# Patient Record
(demographics unavailable — no encounter records)

---

## 2025-06-27 NOTE — ASSESSMENT
[FreeTextEntry1] : 78F   New dx Non Ischemic Cardiomyopathy LVEF 30-35%.  Hypertensive disease and MR likely played a role Moderate to Severe Mitral Regurgitation Moderate non obstructive CAD on CCTA HTN HLD   EKG today for sCHF - sinus rhyhtm LVH with strain Prior records personally reviewed [place] hospitalization for new heart failure, discharged 6/11/25.    - she is well compensated, stable weight, exam benign.   BPs can likely be better optimized but patient is very anti medication and wants to be off her current meds.  Counseled on importance of medical therapy for severe sCHF.  It appears she will continue for now - check labs assess renal function - continue toprol xl 50mg, entresto 24-26mg bid for CHF - continue jardiance 10mg for CHF, mild diuretic effect appears to be all she needs at the moment.  No loop diuretics warranted.   - continue aspirin and lipitor 20mg for moderate non obs CAD - if labs stable, can return in 4-6 weeks for reassessment - will obtain TTE after 3 months of GDMT, reassess LVEF and severity of MR once optimized.

## 2025-06-27 NOTE — PHYSICAL EXAM
[Well Developed] : well developed [Well Nourished] : well nourished [No Acute Distress] : no acute distress [Normal Conjunctiva] : normal conjunctiva [Normal Venous Pressure] : normal venous pressure [No Carotid Bruit] : no carotid bruit [Normal S1, S2] : normal S1, S2 [No Murmur] : no murmur [No Rub] : no rub [No Gallop] : no gallop [Clear Lung Fields] : clear lung fields [Good Air Entry] : good air entry [No Respiratory Distress] : no respiratory distress  [Soft] : abdomen soft [Non Tender] : non-tender [Normal Bowel Sounds] : normal bowel sounds [No Masses/organomegaly] : no masses/organomegaly [Moves all extremities] : moves all extremities [No Focal Deficits] : no focal deficits [Normal Speech] : normal speech [No ulcers] : no ulcers [No edema] : no edema [No varicosities] : no varicosities [No chronic venous stasis changes] : no chronic venous stasis changes [No cyanosis] : no cyanosis [No rashes] : no rashes [Normal peripheral pulses] : : normal peripheral pulses

## 2025-06-27 NOTE — HISTORY OF PRESENT ILLNESS
[FreeTextEntry1] : 78F   Recent admission with new severe systolic heart failure, ruled out for significant CAD with coronary CTA.  Required IV diuresis and was started on GDMT for sCHF.   She had presented with increasing SOB and leg swelling.    Home BPs since dc 130s/80s range.   Weight 128 lbs pre admission now 115 lbs.   No recurrent sob or leg swelling.  No chest pain.   She is feeling a lot better.   She doesnt like taking all of her new heart medications.    Non smoker No etoh use